# Patient Record
(demographics unavailable — no encounter records)

---

## 2025-01-21 NOTE — ASSESSMENT
[FreeTextEntry1] : Mr. HUGHES presented for evaluation of a first seizure in 2021. Evaluation at [facility] was unremarkable by report, but EEG was relatively brief. He had recurrence of seizures in 2023 - possibly due to increased stress from mother's dementia or change in job. Partial seizure recurred in Sept 2024, no seizure since starting levetiracetam ER 66445 qHS.    Plan: - DMV paperwork annual paperwork completed - seizure occured out of sleep. No daytime seizures.  - RTC 6 mo. - counselled to get regular sleep, take LEV about 3 hr before bedtime.   I have spent 30 minutes or longer reviewing patient data or discussing with the patient  the cause of seizures or seizure-like events and comorbid conditions, assessing the risk of recurrence, educating the patient or family to recognize seizures, and discussing possible treatment options for seizures and comorbid conditions and possible side effects of medications. I also discussed seizure safety, and ways of reducing seizure risk. Greater than 50% of the encounter time was spent on counseling and coordination of care for reviewing records in [employer], discussion with patient regarding plan.

## 2025-01-21 NOTE — HISTORY OF PRESENT ILLNESS
[FreeTextEntry1] : *** 01/21/2025  *** Has been taking higher dose of levetiracetam ER 1500 qHS. In first month, noted increased fatigue, but now OK.  No change in mood or irritability as far as he can tell, spouse affirms.  Takes levetiracetam ER at 9p, goes to bed about MN - the last seizure occurred around that time.   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 Wife is also on the phone. Had episode last Friday 09/13/2024 which he called about. Pt states he fell asleep watching tv, within few min of falling asleep, woke up and jumped up. walked around the room. States he felt disoriented, knew where he was but felt like a paralysis in body, felt unaware of what was around him, was just aware of the tv being on. States he called out to his wife, because he felt like something was "happening". Was speaking with wife and walking up and down the hallway with wife holding hands, trying to stop sz from progreesing. States he couldnt move independently. Lasted under 3 min. Had light bolts for 20-30 sec in the right peripheral field. Denies HA. Denies missed/delayed ASM, He increased Levetiracetam ER 1500 qhs reports feeling tired but thinks he is adjusting. He sleeps 6-7 hours admits needs to sleep more,, reports increased stress level at work and had 2 kids, youngest will be 3 yo.  ***09/09/2024*** Mr. VALENCIA reports no new episodes or seizures since last visit.  had an episode in April where he was sitting at kitchen and helping daughter with homework, and had a moment of 'out of body disorientation' while turning his body, with everything 'warping' and his environment appearing sean.  Mr. VALENCIA was aware during this episode. He jumped up and started walking in a [place] to try to ground himself. No shaking/trembling. Lasted a few seconds in duration. Mr. VALENCIA also had an episode in June, where he reports that he was presenting in a meeting with a room full of people. While he was talking, everything seemed to blur and turn black. He felt like his mind was in two places, but continued to speak without interruption. He describes having a feeling that something felt off and that something unusual was happening. Episode lasted a few seconds in duration. Mr. VALENCIA endorses that during this period he had a lot of work related stressors.  Mr. VALENCIA also raised question whether his children are at risk for seizure d/o.  His older daughter has episodes of arousal from sleep with startle and report of brief stiffening in sleep that has raised concern.    He is consistent with levetiracetam 1000 ER QHS, has not missed doses. He feels levetiracetam is generally tolerated well, sometimes increased irritability which he and family reports is manageable.  Stressors: standard work/life stressors Current meds: keppra 1000 ER QHS, zolpidem PRN (but has not taken recently)  *** 01/08/2024 *** Mr. VALENCIA is doing well on levetiracetam ER 1000 qHS. He reports no overnight clinical seizures. He is also no longer getting the brief overnight awakenings with disorientation that he had prior to starting current dose of levetiracetam. History s/f recurrence of seizures in April 2023, started levetiracetam 250 q12, then had last recurrence on May 15, 2023, levetiracetam dose increased to levetiracetam ER 1000 qHS wirthout further recurrence. Mr. VALENCIA reports no side effects - he endorses some mild irritability on some days but does not think this is necessarily related to levetiracetam.  *** 07/28/2023 *** Patient returns for follow-up visit. He was last seen by me approximately 2 years ago after he presented following new onset convulsion out of sleep. He was recurrence-free off ASM for nearly 18 months, but then had recurrent seizures out of sleep at end of April and again in mid-May. Both seizures occurred off of ASM (patient had not started levetiracetam 250 q12 after initial recurrence). While taking levetiracetam 250 q12, patient experience another seizure on 5/15, again out of sleep (had fallen asleep reading a book). Spouse notes that this episode was milder than prior, possibly because of having taken levetiracetam. After this seizure, dose of levetiracetam was increased to ER 1000 qHS on 5/30/23. Patient has not had further seizures. Patient describes warning of seizure that wakes him from sleep, and he has sufficient time to call his wife for help before progressing to convulsion within a few seconds. Patient denies any side effects of medication. In last 6 months, Mr. Valencia changed jobs, with new position described as more stressful. However, he has been sleeping well. He is conscientious about getting sufficient sleep, though this is difficult because he has small children. He is not driving since seizure recurred.  Prior version: Mr. VALENCIA returns for f/u. He experienced 3 recurrent seizures this spring, twice before starting LEV 250q12, the last at end of May occurring on  q12. Now taking levetiracetam ER 1000 q12 without recurrence since May. Mr. VALENCIA describes seizure as occurring out of sleep, he has partial awareness and has called out for spouse on 2 occasions. He then feels his body begin to shake and then loses awareness. Spouse indicates that last seizure, on levetiracetam, was less intense and shorter than prior seizures. Mr. VALENCIA is not driving. He does endorse that some nights he wakes up and unable to get back to sleep, leaving him anxious that he's at elevated risk of seizure and unable to return to sleep.  *** 06/02/2021 *** Daniel reports that he continues to feel well, in fact he feels that much of his anxiety has normalized with time passing since his seizure in December 2020. He continues to feel that he is sleeping significantly better than he was in December of last year. He also endorses reduced levels of stress. He has a seizure detection watch like device that he was wearing during this visit. He is 6 months seizure-free. He is not taking antiseizure medication.  *** 01/25/2021 *** DANIEL reporrts that he is feeling well. Sleeeping well. Anxiety is better - as time passes after seizure. Amb EEG is scheduled for Feb 22. PCP recommended that he go on AED. Mr. VALENCIA is weighing the decision. He would like to start anticonvulsant after amb EEG is completed.  *** 01/08/2021 *** DANIEL presents for evaluation of first time seizure, went to bed, a few minutes after falling asleep he told wife "help me I can't sleep", wife noted that he stiffened in bed followed by a convulsion. Wife describes that appeared white, turning blue. Spouse called 911. Event likely lasted less that 5 min. Police arrived first, and when spouse returned to room, Mr. VALENCIA was up in bed. Mr. VALENCIA was very disoriented and dysarthric, and had difficulty walking. He was combative and frightened, yelled at police and EMTs. After about 10-15 minutes, he started to return to baseline, and understood that he needed to get in ambulance. EMS took him to [facility], admitted for 3 days. Had CT, MRI/A brain and neck, and 18h EEG and cardiac telemetry. All tests were normal. DC'd from hospital. Saw outpatient neurologist who referred him to cardiologist. Mr. VALENCIA is now getting cardiac workup that so far is unremarkable. Cardiologist think event was seizure. PCP is also cardiologist and also thinks this event was a seizure. Spouse notes that sleep has been poor, light sleeper, wakes up in middle of night with "panic" - prior to seizure would awaken 1-2 per week would awaken with a startle lasting a moment then would go back to sleep. Spouse feels sleep may have been better since event.  PMH: GI upset - had endoscopy - prescribed famotidine a few months ago. MEDS: famotidine ALL: cephalosporin - sweats  SOC - no tobacco (dc'd 10y ago); rare glass of wine; no recreational drugs; works as  FH - 2 young kids, no FH of seizures, Grandparents - Crohns disease, lung CA, CHF, M- memory loss. ROS - negative x 14 systems, except GI symptoms that began 1-2 years - feels he has excessive gas all day every day, varying degrees of discomfort in chest

## 2025-04-01 NOTE — PHYSICAL EXAM
[No Acute Distress] : no acute distress [No Edema] : there was no peripheral edema [Normal] : affect was normal and insight and judgment were intact [FreeTextEntry1] : educated re: self exam

## 2025-04-01 NOTE — HEALTH RISK ASSESSMENT
[Very Good] : ~his/her~  mood as very good [Yes] : Yes [Monthly or less (1 pt)] : Monthly or less (1 point) [1 or 2 (0 pts)] : 1 or 2 (0 points) [Never (0 pts)] : Never (0 points) [No] : In the past 12 months have you used drugs other than those required for medical reasons? No [No falls in past year] : Patient reported no falls in the past year [0] : 2) Feeling down, depressed, or hopeless: Not at all (0) [PHQ-2 Negative - No further assessment needed] : PHQ-2 Negative - No further assessment needed [HIV Test offered] : HIV Test offered [Hepatitis C test offered] : Hepatitis C test offered [With Family] : lives with family [Employed] : employed [Graduate School] : graduate school [] :  [# Of Children ___] : has [unfilled] children [Feels Safe at Home] : Feels safe at home [Fully functional (bathing, dressing, toileting, transferring, walking, feeding)] : Fully functional (bathing, dressing, toileting, transferring, walking, feeding) [Smoke Detector] : smoke detector [Carbon Monoxide Detector] : carbon monoxide detector [Seat Belt] :  uses seat belt [Sunscreen] : uses sunscreen [With Patient/Caregiver] : , with patient/caregiver [Aggressive treatment] : aggressive treatment [Former] : Former [> 15 Years] : > 15 Years [NO] : No [FreeTextEntry1] : none [de-identified] : Neurol [Audit-CScore] : 1 [de-identified] : weights [de-identified] : healthy  [[id number]] : 0 [Change in mental status noted] : No change in mental status noted [Language] : denies difficulty with language [ColonoscopyDate] : NA [de-identified] :   [AdvancecareDate] : 04/25 [de-identified] : social

## 2025-04-01 NOTE — HISTORY OF PRESENT ILLNESS
[FreeTextEntry1] : cc: new pt, physical  [de-identified] : Pt presented to establish care, needs physical, He denies CP,SOB,abd pain, no N,no V, no C.no D. Pt is under NEurol care, HX seizures , last attack in 09/24, meds were adjusted then ,no problem since

## 2025-04-07 NOTE — HISTORY OF PRESENT ILLNESS
[FreeTextEntry1] : 41-year-old male being seen in cardiac evaluation because of an abnormal EKG.  He has no prior history of heart disease and no cardiac symptoms.  He has a history of epilepsy and started on Keppra.  A cardiogram was done as part of a recent evaluation.  It was read as "abnormal" by the computer because of incomplete right bundle branch block and a questionably enlarged P wave.  Blood work done at the evaluation shows she is hypercholesterolemic with an LDL of 168.  He thinks his cholesterols been elevated in the past, although not to this degree.  His father is hypercholesterolemic, but there is no history of premature CAD.  He has no other risk factors for CAD.

## 2025-04-07 NOTE — DISCUSSION/SUMMARY
[FreeTextEntry1] :  has no symptoms.  His exam shows regular rhythm, normal blood pressure, clear lungs, and a normal cardiac exam.  EKG findings are as noted above.  They are minor and nonspecific and the EKG is probably within normal limits.  I do not think any further evaluation is required.  His cholesterol is significantly elevated.  I think it should be treated.  He is a little hesitant and so will be scheduled for coronary artery calcium score to get further evaluation of his risk.